# Patient Record
Sex: FEMALE | Race: WHITE | NOT HISPANIC OR LATINO | Employment: FULL TIME | URBAN - METROPOLITAN AREA
[De-identification: names, ages, dates, MRNs, and addresses within clinical notes are randomized per-mention and may not be internally consistent; named-entity substitution may affect disease eponyms.]

---

## 2017-09-20 ENCOUNTER — OFFICE VISIT (OUTPATIENT)
Dept: FAMILY MEDICINE | Facility: CLINIC | Age: 72
End: 2017-09-20
Payer: MEDICARE

## 2017-09-20 ENCOUNTER — LAB VISIT (OUTPATIENT)
Dept: LAB | Facility: HOSPITAL | Age: 72
End: 2017-09-20
Attending: FAMILY MEDICINE
Payer: MEDICARE

## 2017-09-20 VITALS
WEIGHT: 150 LBS | HEIGHT: 66 IN | BODY MASS INDEX: 24.11 KG/M2 | DIASTOLIC BLOOD PRESSURE: 72 MMHG | HEART RATE: 85 BPM | TEMPERATURE: 98 F | SYSTOLIC BLOOD PRESSURE: 155 MMHG

## 2017-09-20 DIAGNOSIS — R03.0 ELEVATED BLOOD PRESSURE READING WITHOUT DIAGNOSIS OF HYPERTENSION: ICD-10-CM

## 2017-09-20 DIAGNOSIS — Z12.11 COLON CANCER SCREENING: ICD-10-CM

## 2017-09-20 DIAGNOSIS — Z13.220 SCREENING CHOLESTEROL LEVEL: ICD-10-CM

## 2017-09-20 DIAGNOSIS — Z78.0 ASYMPTOMATIC AGE-RELATED POSTMENOPAUSAL STATE: ICD-10-CM

## 2017-09-20 DIAGNOSIS — K21.9 GASTROESOPHAGEAL REFLUX DISEASE, ESOPHAGITIS PRESENCE NOT SPECIFIED: ICD-10-CM

## 2017-09-20 DIAGNOSIS — M79.7 FIBROMYALGIA: ICD-10-CM

## 2017-09-20 DIAGNOSIS — M81.0 AGE-RELATED OSTEOPOROSIS WITHOUT CURRENT PATHOLOGICAL FRACTURE: ICD-10-CM

## 2017-09-20 DIAGNOSIS — Z12.31 ENCOUNTER FOR SCREENING MAMMOGRAM FOR BREAST CANCER: ICD-10-CM

## 2017-09-20 DIAGNOSIS — E78.5 HYPERLIPIDEMIA, UNSPECIFIED HYPERLIPIDEMIA TYPE: ICD-10-CM

## 2017-09-20 DIAGNOSIS — Z11.59 NEED FOR HEPATITIS C SCREENING TEST: ICD-10-CM

## 2017-09-20 DIAGNOSIS — R03.0 ELEVATED BLOOD PRESSURE READING WITHOUT DIAGNOSIS OF HYPERTENSION: Primary | ICD-10-CM

## 2017-09-20 DIAGNOSIS — E78.00 HIGH BLOOD CHOLESTEROL LEVEL: ICD-10-CM

## 2017-09-20 DIAGNOSIS — M19.90 ARTHRITIS: ICD-10-CM

## 2017-09-20 PROBLEM — R73.9 HYPERGLYCEMIA: Status: ACTIVE | Noted: 2017-09-20

## 2017-09-20 LAB
ALBUMIN SERPL BCP-MCNC: 4.2 G/DL
ALP SERPL-CCNC: 62 U/L
ALT SERPL W/O P-5'-P-CCNC: 14 U/L
ANION GAP SERPL CALC-SCNC: 9 MMOL/L
AST SERPL-CCNC: 14 U/L
BILIRUB SERPL-MCNC: 0.7 MG/DL
BUN SERPL-MCNC: 18 MG/DL
CALCIUM SERPL-MCNC: 9.6 MG/DL
CHLORIDE SERPL-SCNC: 105 MMOL/L
CHOLEST SERPL-MCNC: 223 MG/DL
CHOLEST/HDLC SERPL: 3.9 {RATIO}
CO2 SERPL-SCNC: 26 MMOL/L
CREAT SERPL-MCNC: 0.9 MG/DL
EST. GFR  (AFRICAN AMERICAN): >60 ML/MIN/1.73 M^2
EST. GFR  (NON AFRICAN AMERICAN): >60 ML/MIN/1.73 M^2
GLUCOSE SERPL-MCNC: 130 MG/DL
HDLC SERPL-MCNC: 57 MG/DL
HDLC SERPL: 25.6 %
LDLC SERPL CALC-MCNC: 135.4 MG/DL
NONHDLC SERPL-MCNC: 166 MG/DL
POTASSIUM SERPL-SCNC: 4.5 MMOL/L
PROT SERPL-MCNC: 7.9 G/DL
SODIUM SERPL-SCNC: 140 MMOL/L
TRIGL SERPL-MCNC: 153 MG/DL

## 2017-09-20 PROCEDURE — 1126F AMNT PAIN NOTED NONE PRSNT: CPT | Mod: ,,, | Performed by: FAMILY MEDICINE

## 2017-09-20 PROCEDURE — 99213 OFFICE O/P EST LOW 20 MIN: CPT | Mod: PBBFAC,PO | Performed by: FAMILY MEDICINE

## 2017-09-20 PROCEDURE — 99204 OFFICE O/P NEW MOD 45 MIN: CPT | Mod: S$PBB,,, | Performed by: FAMILY MEDICINE

## 2017-09-20 PROCEDURE — 86803 HEPATITIS C AB TEST: CPT

## 2017-09-20 PROCEDURE — 80061 LIPID PANEL: CPT

## 2017-09-20 PROCEDURE — 1159F MED LIST DOCD IN RCRD: CPT | Mod: ,,, | Performed by: FAMILY MEDICINE

## 2017-09-20 PROCEDURE — 99999 PR PBB SHADOW E&M-EST. PATIENT-LVL III: CPT | Mod: PBBFAC,,, | Performed by: FAMILY MEDICINE

## 2017-09-20 PROCEDURE — 80053 COMPREHEN METABOLIC PANEL: CPT

## 2017-09-20 PROCEDURE — 36415 COLL VENOUS BLD VENIPUNCTURE: CPT | Mod: PO

## 2017-09-20 RX ORDER — VITAMIN E 268 MG
400 CAPSULE ORAL DAILY
COMMUNITY

## 2017-09-20 RX ORDER — ACETAMINOPHEN, DIPHENHYDRAMINE HCL, PHENYLEPHRINE HCL 325; 25; 5 MG/1; MG/1; MG/1
TABLET ORAL
COMMUNITY

## 2017-09-20 RX ORDER — IBUPROFEN 400 MG/1
400 TABLET ORAL EVERY 6 HOURS PRN
COMMUNITY

## 2017-09-20 RX ORDER — CHOLECALCIFEROL (VITAMIN D3) 25 MCG
1000 TABLET ORAL DAILY
COMMUNITY

## 2017-09-20 RX ORDER — UBIDECARENONE 30 MG
100 CAPSULE ORAL 3 TIMES DAILY
COMMUNITY

## 2017-09-20 RX ORDER — CYANOCOBALAMIN (VITAMIN B-12) 500 MCG
TABLET ORAL
COMMUNITY

## 2017-09-20 RX ORDER — DIPHENHYDRAMINE HCL 12.5MG/5ML
LIQUID (ML) ORAL 4 TIMES DAILY PRN
COMMUNITY

## 2017-09-20 RX ORDER — IBUPROFEN 100 MG/5ML
1000 SUSPENSION, ORAL (FINAL DOSE FORM) ORAL DAILY
COMMUNITY

## 2017-09-20 NOTE — PROGRESS NOTES
Subjective:      Patient ID: Olga Clemons is a 72 y.o. female.    Chief Complaint: Establish Care    Llily is coming in to establish with me today.        Health Maintenance Due   Topic Date Due    Hepatitis C Screening  1945    Lipid Panel  1945    TETANUS VACCINE  02/04/1963    Mammogram  02/04/1985    DEXA SCAN  02/04/1985    Zoster Vaccine  02/04/2005    Pneumococcal (65+) (1 of 2 - PCV13) 02/04/2010    Colonoscopy  06/01/2014    Influenza Vaccine  08/01/2017       Past Medical History:  Past Medical History:   Diagnosis Date    Allergy     Arthritis     Asthma     Fibromyalgia     GERD (gastroesophageal reflux disease)     Hypoglycemia      Past Surgical History:   Procedure Laterality Date    ADENOIDECTOMY      FOOT NEUROMA SURGERY      HYSTERECTOMY      SINUS SURGERY      TONSILLECTOMY       Review of patient's allergies indicates:   Allergen Reactions    Ambien [zolpidem]     Iodine and iodide containing products     Morphine     Norco [hydrocodone-acetaminophen]     Versed [midazolam]      No current outpatient prescriptions on file prior to visit.     No current facility-administered medications on file prior to visit.      Social History     Social History    Marital status:      Spouse name: N/A    Number of children: N/A    Years of education: N/A     Occupational History    Not on file.     Social History Main Topics    Smoking status: Former Smoker    Smokeless tobacco: Never Used    Alcohol use Not on file    Drug use: Unknown    Sexual activity: Not on file     Other Topics Concern    Not on file     Social History Narrative    No narrative on file     Family History   Problem Relation Age of Onset    Muscle cancer Mother     Breast cancer Sister     Lung cancer Maternal Grandmother     Heart attack Neg Hx     Stroke Neg Hx     Diabetes Neg Hx          She had a colonoscopy done by Dr. Mclaughlin in [facility] and the  "hospital has been wiped out by Sharon and her scope was negative. She does not want a colonoscopy and wants to do the fit test.     Review of Systems   Constitutional: Negative for fatigue, fever and unexpected weight change.   HENT: Negative for congestion, ear pain, postnasal drip and sore throat.    Eyes: Negative for visual disturbance.   Respiratory: Negative for cough, chest tightness, shortness of breath and wheezing.    Cardiovascular: Negative for chest pain, palpitations and leg swelling.   Gastrointestinal: Negative for abdominal pain, blood in stool, constipation, diarrhea, nausea and vomiting.   Genitourinary: Negative for dysuria and hematuria.   Musculoskeletal: Positive for arthralgias and back pain.   Neurological: Negative for weakness and numbness.       Objective:   BP (!) 155/72   Pulse 85   Temp 97.5 °F (36.4 °C) (Oral)   Ht 5' 6" (1.676 m)   Wt 68 kg (150 lb)   BMI 24.21 kg/m²     Physical Exam   Constitutional: She appears well-developed and well-nourished. She is cooperative.   HENT:   Head: Normocephalic and atraumatic.   Right Ear: Tympanic membrane, external ear and ear canal normal.   Left Ear: Tympanic membrane, external ear and ear canal normal.   Nose: Nose normal.   Mouth/Throat: Uvula is midline and mucous membranes are normal. No oral lesions. No oropharyngeal exudate, posterior oropharyngeal edema or posterior oropharyngeal erythema.   Eyes: EOM and lids are normal. Pupils are equal, round, and reactive to light. Right eye exhibits no discharge. Left eye exhibits no discharge. Right conjunctiva is not injected. Right conjunctiva has no hemorrhage. Left conjunctiva is not injected. Left conjunctiva has no hemorrhage. No scleral icterus. Right eye exhibits no nystagmus. Left eye exhibits no nystagmus.   Neck: Normal range of motion and full passive range of motion without pain. Neck supple. No JVD present. No tracheal tenderness present. Carotid bruit is not present. No " tracheal deviation present. No thyroid mass and no thyromegaly present.   Cardiovascular: Normal rate, regular rhythm, S1 normal and S2 normal.    No murmur heard.  Pulses:       Carotid pulses are 2+ on the right side, and 2+ on the left side.       Radial pulses are 2+ on the right side, and 2+ on the left side.        Posterior tibial pulses are 2+ on the right side, and 2+ on the left side.   Pulmonary/Chest: Effort normal and breath sounds normal. No respiratory distress. She has no wheezes. She has no rhonchi. She has no rales.   Abdominal: Soft. Normal appearance, normal aorta and bowel sounds are normal. She exhibits no distension, no abdominal bruit, no pulsatile midline mass and no mass. There is no hepatosplenomegaly. There is no tenderness. There is no rebound.   Musculoskeletal:        Right knee: She exhibits no swelling. No tenderness found.        Left knee: She exhibits no swelling. No tenderness found.   Lymphadenopathy:        Head (right side): No submental and no submandibular adenopathy present.        Head (left side): No submental and no submandibular adenopathy present.     She has no cervical adenopathy.   Neurological: She is alert. She has normal strength. No cranial nerve deficit or sensory deficit.   Skin: Skin is warm and dry. No rash noted. No cyanosis. Nails show no clubbing.   Psychiatric: She has a normal mood and affect. Her speech is normal and behavior is normal. Thought content normal. Cognition and memory are normal.       Assessment:     1. Elevated blood pressure reading without diagnosis of hypertension    2. Arthritis    3. Fibromyalgia    4. Gastroesophageal reflux disease, esophagitis presence not specified    5. Encounter for screening mammogram for breast cancer    6. Colon cancer screening    7. Asymptomatic age-related postmenopausal state    8. Age-related osteoporosis without current pathological fracture    9. Need for hepatitis C screening test    10. Screening  cholesterol level    11. High blood cholesterol level    12. Hyperlipidemia, unspecified hyperlipidemia type        Plan:   Olga was seen today for establish care.    Diagnoses and all orders for this visit:    Elevated blood pressure reading without diagnosis of hypertension  -     Comprehensive metabolic panel; Future    Arthritis    Fibromyalgia    Gastroesophageal reflux disease, esophagitis presence not specified    Encounter for screening mammogram for breast cancer  -     Mammo Digital Screening Bilat with CAD; Future    Colon cancer screening  -     Fecal Immunochemical Test (iFOBT); Future    Asymptomatic age-related postmenopausal state    Age-related osteoporosis without current pathological fracture  -     DXA Bone Density Spine And Hip; Future    Need for hepatitis C screening test  -     Hepatitis C antibody; Future    Screening cholesterol level  -     Lipid panel; Future    High blood cholesterol level    Hyperlipidemia, unspecified hyperlipidemia type  -     Lipid panel; Future

## 2017-09-21 LAB — HCV AB SERPL QL IA: NEGATIVE

## 2017-09-21 NOTE — PROGRESS NOTES
I will do the a1c but if it is abnormal, I will require a 2 hour GTT to determine the depth of the abnormality that we have.

## 2017-09-21 NOTE — PROGRESS NOTES
I have reviewed the Hepatitis C Screening Test.  The findings indicate that at this time it is negative and because of that, no other testing is indicated.    Ask her to sign up on [employer] if she will.

## 2017-09-21 NOTE — PROGRESS NOTES
The patient has a high fasting glucose and it may be a borderline diabetic.  Please schedule the patient for a 2 hour glucose challenge along with a hemoglobin A1c.  Schedule the patient to followup with me 1 week after the testing is done.    The total cholesterol is high and the triglycerides are normal but the LDL is pending.

## 2017-09-26 ENCOUNTER — TELEPHONE (OUTPATIENT)
Dept: RADIOLOGY | Facility: HOSPITAL | Age: 72
End: 2017-09-26

## 2017-09-27 ENCOUNTER — HOSPITAL ENCOUNTER (OUTPATIENT)
Dept: RADIOLOGY | Facility: HOSPITAL | Age: 72
Discharge: HOME | End: 2017-09-27
Attending: FAMILY MEDICINE
Payer: MEDICARE

## 2017-09-27 DIAGNOSIS — M81.0 AGE-RELATED OSTEOPOROSIS WITHOUT CURRENT PATHOLOGICAL FRACTURE: ICD-10-CM

## 2017-09-27 PROBLEM — M85.80 OSTEOPENIA: Status: ACTIVE | Noted: 2017-09-27

## 2017-09-27 PROCEDURE — 77080 DXA BONE DENSITY AXIAL: CPT | Mod: TC,PO

## 2017-09-27 PROCEDURE — 77080 DXA BONE DENSITY AXIAL: CPT | Mod: 26,,, | Performed by: RADIOLOGY

## 2017-09-27 NOTE — PROGRESS NOTES
The patient has an abnormal DEXA scan which is showing osteopenia.  The patient needs to start using weight bearing exercises to help reduce the risk of a fracture.  Also, please start oscal 500+d taking 1 po tid.    Give #90 and rf x 11.    Ask the patient to recheck the DEXA scan in 2 years.     Give handout.       Return to Web version   Osteoporosis in Women: Keeping Your Bones Healthy and Strong  What is osteoporosis?   In osteoporosis, the inside of the bones becomes porous from a loss of calcium (see the picture below). This is called losing bone mass. Over time, this weakens the bones and makes them more likely to break.  Osteoporosis is much more common in women than in men. This is because women have less bone mass than men, tend to live longer and take in less calcium, and need the female hormone estrogen to keep their bones strong. If men live long enough, they are also at risk of getting osteoporosis later in life.  Once total bone mass has peakedaround age 35all adults start to lose it. In women, the rate of bone loss speeds up after menopause, when estrogen levels fall. Since the ovaries make estrogen, faster bone loss may also occur if both ovaries are removed by surgery.    What are the signs of osteoporosis?  You may not know you have osteoporosis until you have serious signs. Signs include broken bones, low back pain or a hunched back. You may also get shorter over time because osteoporosis can cause your vertebrae (the bones in your spine) to collapse. These problems tend to occur after a lot of bone calcium has already been lost.  Am I at risk for osteoporosis?         Risk factors for osteoporosis  Menopause before age 48   Surgery to remove ovaries before menopause   Not getting enough calcium   Not getting enough exercise   Smoking   Osteoporosis in your family   Alcohol abuse   Thin body and small bone frame   Fair skin ( or  race)   Hyperthyroidism   Long-term use of oral  steroids       See the box to the right for a list of things that put you at risk for osteoporosis. The more of these that apply to you, the higher your risk is. Talk to your family doctor about your risk factors.  Will I need a bone density test?  Check with your doctor. For many women, osteoporosis (or the risk of it) can be diagnosed without testing. When testing is appropriate, doctors use equipment that takes a picture of the bones to see if they are becoming porous.  What about hormone replacement therapy?  Hormone replacement therapy (HRT) is one way to prevent osteoporosis or keep it from getting worse.  In HRT, you take hormones (estrogen and progestin together, or estrogen alone) to counteract the drop in estrogen that happens at menopause or when the ovaries are removed by surgery.  Women who take HRT are at an increased risk for breast cancer, heart attack, stroke, serious blood clots and Alzheimer's disease.  Many physicians now recommend that their patients on HRT stop taking it to prevent osteoporosis.  Factors such as your health history and your familys health history will be important when weighing the risks and benefits of HRT. Talk to your doctor about whether its right for you.  What is calcitonin?   Calcitonin (some brand names: Calcimar, Miacalcin) is a hormone that helps prevent further bone loss and reduces the pain that some people have with osteoporosis.  Calcitonin can be taken as a shot or as a nasal spray. Its most common side effect is nausea.  What is ibandronate sodium?  Ibandronate sodium (brand name: Boniva) is a new drug that is taken once a month. It is not a hormone, but it slows bone loss and increases bone density. Some of the possible side effects include upset stomach, heartburn, nausea and diarrhea.   What are alendronate and risedronate?  Alendronate (brand name: Fosamax) and risedronate (brand name: Actonel) are not hormones, but are used to help prevent and treat  osteoporosis. These drugs help reduce the risk of fractures by decreasing the rate of bone loss. Their most common side effect is an upset stomach.  What is raloxifene?   Raloxifene (brand name: Evista) is a drug used to prevent and treat osteoporosis by increasing bone density. It is not a hormone, but it mimics some of the effects of estrogen. Side effects may include hot flashes and a risk of blood clots.  What is teriparatide?   Teriparatide (brand name: Forteo) is a new injectable synthetic hormone used once a day for the treatment of osteoporosis. It causes new bone growth. Common side effects may include nausea, dizziness and leg cramps.  How much calcium do I need?  Before menopause, you need about 1,000 mg of calcium per day. After menopause, you need 1,000 mg of calcium per day if you're taking estrogen and 1,500 mg of calcium per day if you're not taking estrogen.   Its usually best to try to get calcium from food. Nonfat and low-fat dairy products are good sources of calcium. Other sources of calcium include dried beans, sardines and broccoli.  About 300 mg of calcium are in each of the followin cup of milk or yogurt, 2 cups of broccoli, or 6 to 7 sardines.  If you dont get enough calcium from the food you eat, your doctor may suggest taking a calcium pill. Take it at meal time or with a sip of milk. Vitamin D and lactose (the natural sugar in milk) help your body absorb the calcium.           Tips to keep bones strong  Exercise.   Eat a well-balanced diet with at least 1,000 mg of calcium a day.   Quit smoking. Smoking makes osteoporosis worse.   Talk to your doctor about HRT or other medicines to prevent or treat osteoporosis.          Reviewed/Updated:   Created:   This handout provides a general overview on this topic and may not apply to everyone. To find out if this handout applies to you and to get more information on this subject, talk to your family doctor.   Copyright © 0425-3676  [employer]   Permission is granted to print and photocopy this material for nonprofit educational uses.   Written permission is required for all other uses, including electronic uses.  Home  Privacy Policy  Contact Us  About This Site  What's New                       Calcium in Your Diet   Why do I need calcium in my diet?   Calcium is the most abundant mineral in your body.  It is necessary for nerves to transmit messages to muscles.  In addition, it is needed for those muscles to be able to contract in response to the messages.  It is also needed for blood to clot.    Most importantly, however, calcium is stored in your bones and teeth and helps make them hard and strong.    How much calcium do I need in my diet?   The [place] National Institutes of Health Consensus Conference on Optimal Calcium Intake in 1994 recommended higher daily calcium intakes for older men and women than for younger adults.  The new recommendation for men between the ages of 51 and 65 is 1000 mg per day.  For men over the age of 65, a daily intake of 1500 mg is recommended.    For most women over the age of 50, the recommendation for daily calcium intake is 1500 mg.  For women taking estrogen, the recommendation is lowered to 1000 mg.    Which foods contain calcium?   Your body gets the calcium it needs from calcium-rich foods, primarily milk and dairy products.  Green leafy vegetables, broccoli, okra, fruit, eggs, fish, sardines with bones, and molasses are other good sources of calcium.    What happens if I don't get enough calcium?   If your body does not get enough calcium, you may experience muscle cramps in your hands, feet, and face.    In addition, your bones may lose calcium and become thinner and weaker.  This condition is called osteoporosis, and it can result in:    a gradual loss of height    humping of the back    bones that break easily    serious fractures if you fall.    How can I take care  of myself?    If you are losing height or getting a hump in your back, see your doctor.  If you are diagnosed with osteoporosis, follow your doctor's treatment recommendations.     Take calcium supplements if you are advised to do so.     Eat more calcium-rich food: dairy products, green leafy vegetables, citrus fruit, and sardines.     If you do not have an intolerance for dairy products, add cheese to salads and entrees and milk to casseroles and canned soups.  If you are trying to cut back on fat, use only nonfat milk and fat-free and reduced-fat cheese.     Get plenty of exercise.  Walk a mile a day if you can.    Developed by Saloni Terry M.D., for [employer]

## 2017-10-03 RX ORDER — CALCIUM CARBONATE 500(1250)
1 TABLET ORAL 3 TIMES DAILY
Refills: 0 | COMMUNITY
Start: 2017-10-03 | End: 2018-10-03

## 2017-10-03 NOTE — TELEPHONE ENCOUNTER
----- Message from Donald Olivarez MD sent at 9/27/2017  2:48 PM CDT -----  The patient has an abnormal DEXA scan which is showing osteopenia.  The patient needs to start using weight bearing exercises to help reduce the risk of a fracture.  Also, please start oscal 500+d taking 1 po tid.    Give #90 and rf x 11.    Ask the patient to recheck the DEXA scan in 2 years.     Give handout.       Return to Web version   Osteoporosis in Women: Keeping Your Bones Healthy and Strong  What is osteoporosis?   In osteoporosis, the inside of the bones becomes porous from a loss of calcium (see the picture below). This is called losing bone mass. Over time, this weakens the bones and makes them more likely to break.  Osteoporosis is much more common in women than in men. This is because women have less bone mass than men, tend to live longer and take in less calcium, and need the female hormone estrogen to keep their bones strong. If men live long enough, they are also at risk of getting osteoporosis later in life.  Once total bone mass has peakedaround age 35all adults start to lose it. In women, the rate of bone loss speeds up after menopause, when estrogen levels fall. Since the ovaries make estrogen, faster bone loss may also occur if both ovaries are removed by surgery.    What are the signs of osteoporosis?  You may not know you have osteoporosis until you have serious signs. Signs include broken bones, low back pain or a hunched back. You may also get shorter over time because osteoporosis can cause your vertebrae (the bones in your spine) to collapse. These problems tend to occur after a lot of bone calcium has already been lost.  Am I at risk for osteoporosis?         Risk factors for osteoporosis  Menopause before age 48   Surgery to remove ovaries before menopause   Not getting enough calcium   Not getting enough exercise   Smoking   Osteoporosis in your family   Alcohol abuse   Thin body and small bone frame    Fair skin ( or  race)   Hyperthyroidism   Long-term use of oral steroids       See the box to the right for a list of things that put you at risk for osteoporosis. The more of these that apply to you, the higher your risk is. Talk to your family doctor about your risk factors.  Will I need a bone density test?  Check with your doctor. For many women, osteoporosis (or the risk of it) can be diagnosed without testing. When testing is appropriate, doctors use equipment that takes a picture of the bones to see if they are becoming porous.  What about hormone replacement therapy?  Hormone replacement therapy (HRT) is one way to prevent osteoporosis or keep it from getting worse.  In HRT, you take hormones (estrogen and progestin together, or estrogen alone) to counteract the drop in estrogen that happens at menopause or when the ovaries are removed by surgery.  Women who take HRT are at an increased risk for breast cancer, heart attack, stroke, serious blood clots and Alzheimer's disease.  Many physicians now recommend that their patients on HRT stop taking it to prevent osteoporosis.  Factors such as your health history and your familys health history will be important when weighing the risks and benefits of HRT. Talk to your doctor about whether its right for you.  What is calcitonin?   Calcitonin (some brand names: Calcimar, Miacalcin) is a hormone that helps prevent further bone loss and reduces the pain that some people have with osteoporosis.  Calcitonin can be taken as a shot or as a nasal spray. Its most common side effect is nausea.  What is ibandronate sodium?  Ibandronate sodium (brand name: Boniva) is a new drug that is taken once a month. It is not a hormone, but it slows bone loss and increases bone density. Some of the possible side effects include upset stomach, heartburn, nausea and diarrhea.   What are alendronate and risedronate?  Alendronate (brand name: Fosamax) and risedronate  (brand name: Actonel) are not hormones, but are used to help prevent and treat osteoporosis. These drugs help reduce the risk of fractures by decreasing the rate of bone loss. Their most common side effect is an upset stomach.  What is raloxifene?   Raloxifene (brand name: Evista) is a drug used to prevent and treat osteoporosis by increasing bone density. It is not a hormone, but it mimics some of the effects of estrogen. Side effects may include hot flashes and a risk of blood clots.  What is teriparatide?   Teriparatide (brand name: Forteo) is a new injectable synthetic hormone used once a day for the treatment of osteoporosis. It causes new bone growth. Common side effects may include nausea, dizziness and leg cramps.  How much calcium do I need?  Before menopause, you need about 1,000 mg of calcium per day. After menopause, you need 1,000 mg of calcium per day if you're taking estrogen and 1,500 mg of calcium per day if you're not taking estrogen.   Its usually best to try to get calcium from food. Nonfat and low-fat dairy products are good sources of calcium. Other sources of calcium include dried beans, sardines and broccoli.  About 300 mg of calcium are in each of the followin cup of milk or yogurt, 2 cups of broccoli, or 6 to 7 sardines.  If you dont get enough calcium from the food you eat, your doctor may suggest taking a calcium pill. Take it at meal time or with a sip of milk. Vitamin D and lactose (the natural sugar in milk) help your body absorb the calcium.           Tips to keep bones strong  Exercise.   Eat a well-balanced diet with at least 1,000 mg of calcium a day.   Quit smoking. Smoking makes osteoporosis worse.   Talk to your doctor about HRT or other medicines to prevent or treat osteoporosis.          Reviewed/Updated:   Created:   This handout provides a general overview on this topic and may not apply to everyone. To find out if this handout applies to you and to get more  information on this subject, talk to your family doctor.   Copyright © 5381-1772 [employer]   Permission is granted to print and photocopy this material for nonprofit educational uses.   Written permission is required for all other uses, including electronic uses.  Home  Privacy Policy  Contact Us  About This Site  What's New                       Calcium in Your Diet   Why do I need calcium in my diet?   Calcium is the most abundant mineral in your body.  It is necessary for nerves to transmit messages to muscles.  In addition, it is needed for those muscles to be able to contract in response to the messages.  It is also needed for blood to clot.    Most importantly, however, calcium is stored in your bones and teeth and helps make them hard and strong.    How much calcium do I need in my diet?   The [place] National Institutes of Health Consensus Conference on Optimal Calcium Intake in 1994 recommended higher daily calcium intakes for older men and women than for younger adults.  The new recommendation for men between the ages of 51 and 65 is 1000 mg per day.  For men over the age of 65, a daily intake of 1500 mg is recommended.    For most women over the age of 50, the recommendation for daily calcium intake is 1500 mg.  For women taking estrogen, the recommendation is lowered to 1000 mg.    Which foods contain calcium?   Your body gets the calcium it needs from calcium-rich foods, primarily milk and dairy products.  Green leafy vegetables, broccoli, okra, fruit, eggs, fish, sardines with bones, and molasses are other good sources of calcium.    What happens if I don't get enough calcium?   If your body does not get enough calcium, you may experience muscle cramps in your hands, feet, and face.    In addition, your bones may lose calcium and become thinner and weaker.  This condition is called osteoporosis, and it can result in:    a gradual loss of height    humping of the back     bones that break easily    serious fractures if you fall.    How can I take care of myself?    If you are losing height or getting a hump in your back, see your doctor.  If you are diagnosed with osteoporosis, follow your doctor's treatment recommendations.     Take calcium supplements if you are advised to do so.     Eat more calcium-rich food: dairy products, green leafy vegetables, citrus fruit, and sardines.     If you do not have an intolerance for dairy products, add cheese to salads and entrees and milk to casseroles and canned soups.  If you are trying to cut back on fat, use only nonfat milk and fat-free and reduced-fat cheese.     Get plenty of exercise.  Walk a mile a day if you can.    Developed by Saloni Terry M.D., for [employer]

## 2017-10-04 ENCOUNTER — HOSPITAL ENCOUNTER (OUTPATIENT)
Dept: RADIOLOGY | Facility: HOSPITAL | Age: 72
Discharge: HOME | End: 2017-10-04
Attending: FAMILY MEDICINE
Payer: MEDICARE

## 2017-10-04 VITALS — HEIGHT: 66 IN | WEIGHT: 150 LBS | BODY MASS INDEX: 24.11 KG/M2

## 2017-10-04 DIAGNOSIS — Z12.31 ENCOUNTER FOR SCREENING MAMMOGRAM FOR BREAST CANCER: ICD-10-CM

## 2017-10-04 PROCEDURE — 77063 BREAST TOMOSYNTHESIS BI: CPT | Mod: 26,,, | Performed by: RADIOLOGY

## 2017-10-04 PROCEDURE — 77067 SCR MAMMO BI INCL CAD: CPT | Mod: 26,,, | Performed by: RADIOLOGY

## 2017-10-04 PROCEDURE — 77067 SCR MAMMO BI INCL CAD: CPT | Mod: TC

## 2017-10-13 ENCOUNTER — TELEPHONE (OUTPATIENT)
Dept: FAMILY MEDICINE | Facility: CLINIC | Age: 72
End: 2017-10-13

## 2018-12-19 ENCOUNTER — PATIENT OUTREACH (OUTPATIENT)
Dept: ADMINISTRATIVE | Facility: HOSPITAL | Age: 73
End: 2018-12-19

## 2019-08-21 ENCOUNTER — PES CALL (OUTPATIENT)
Dept: ADMINISTRATIVE | Facility: CLINIC | Age: 74
End: 2019-08-21